# Patient Record
Sex: FEMALE | Race: WHITE | NOT HISPANIC OR LATINO | URBAN - METROPOLITAN AREA
[De-identification: names, ages, dates, MRNs, and addresses within clinical notes are randomized per-mention and may not be internally consistent; named-entity substitution may affect disease eponyms.]

---

## 2024-02-21 ENCOUNTER — INPATIENT (INPATIENT)
Facility: HOSPITAL | Age: 54
LOS: 0 days | Discharge: HOME | DRG: 930 | End: 2024-02-22
Attending: SURGERY | Admitting: SURGERY
Payer: MEDICAID

## 2024-02-21 VITALS
DIASTOLIC BLOOD PRESSURE: 79 MMHG | RESPIRATION RATE: 20 BRPM | SYSTOLIC BLOOD PRESSURE: 162 MMHG | TEMPERATURE: 98 F | WEIGHT: 160.06 LBS | HEART RATE: 84 BPM | OXYGEN SATURATION: 99 %

## 2024-02-21 DIAGNOSIS — S06.5X0A TRAUMATIC SUBDURAL HEMORRHAGE WITHOUT LOSS OF CONSCIOUSNESS, INITIAL ENCOUNTER: ICD-10-CM

## 2024-02-21 LAB
ALBUMIN SERPL ELPH-MCNC: 4.3 G/DL — SIGNIFICANT CHANGE UP (ref 3.5–5.2)
ALBUMIN SERPL ELPH-MCNC: 4.3 G/DL — SIGNIFICANT CHANGE UP (ref 3.5–5.2)
ALP SERPL-CCNC: 105 U/L — SIGNIFICANT CHANGE UP (ref 30–115)
ALP SERPL-CCNC: 108 U/L — SIGNIFICANT CHANGE UP (ref 30–115)
ALT FLD-CCNC: 21 U/L — SIGNIFICANT CHANGE UP (ref 0–41)
ALT FLD-CCNC: 21 U/L — SIGNIFICANT CHANGE UP (ref 0–41)
ANION GAP SERPL CALC-SCNC: 12 MMOL/L — SIGNIFICANT CHANGE UP (ref 7–14)
ANION GAP SERPL CALC-SCNC: 13 MMOL/L — SIGNIFICANT CHANGE UP (ref 7–14)
ANION GAP SERPL CALC-SCNC: 13 MMOL/L — SIGNIFICANT CHANGE UP (ref 7–14)
APPEARANCE UR: ABNORMAL
APTT BLD: 31.5 SEC — SIGNIFICANT CHANGE UP (ref 27–39.2)
AST SERPL-CCNC: 22 U/L — SIGNIFICANT CHANGE UP (ref 0–41)
AST SERPL-CCNC: 26 U/L — SIGNIFICANT CHANGE UP (ref 0–41)
BASOPHILS # BLD AUTO: 0.04 K/UL — SIGNIFICANT CHANGE UP (ref 0–0.2)
BASOPHILS # BLD AUTO: 0.05 K/UL — SIGNIFICANT CHANGE UP (ref 0–0.2)
BASOPHILS NFR BLD AUTO: 0.3 % — SIGNIFICANT CHANGE UP (ref 0–1)
BASOPHILS NFR BLD AUTO: 0.6 % — SIGNIFICANT CHANGE UP (ref 0–1)
BILIRUB SERPL-MCNC: 0.2 MG/DL — SIGNIFICANT CHANGE UP (ref 0.2–1.2)
BILIRUB SERPL-MCNC: 0.2 MG/DL — SIGNIFICANT CHANGE UP (ref 0.2–1.2)
BILIRUB UR-MCNC: NEGATIVE — SIGNIFICANT CHANGE UP
BUN SERPL-MCNC: 15 MG/DL — SIGNIFICANT CHANGE UP (ref 10–20)
BUN SERPL-MCNC: 22 MG/DL — HIGH (ref 10–20)
BUN SERPL-MCNC: 24 MG/DL — HIGH (ref 10–20)
CALCIUM SERPL-MCNC: 8.8 MG/DL — SIGNIFICANT CHANGE UP (ref 8.4–10.4)
CALCIUM SERPL-MCNC: 9.4 MG/DL — SIGNIFICANT CHANGE UP (ref 8.4–10.5)
CALCIUM SERPL-MCNC: 9.7 MG/DL — SIGNIFICANT CHANGE UP (ref 8.4–10.5)
CHLORIDE SERPL-SCNC: 105 MMOL/L — SIGNIFICANT CHANGE UP (ref 98–110)
CO2 SERPL-SCNC: 21 MMOL/L — SIGNIFICANT CHANGE UP (ref 17–32)
CO2 SERPL-SCNC: 21 MMOL/L — SIGNIFICANT CHANGE UP (ref 17–32)
CO2 SERPL-SCNC: 22 MMOL/L — SIGNIFICANT CHANGE UP (ref 17–32)
COLOR SPEC: ABNORMAL
CREAT SERPL-MCNC: 0.7 MG/DL — SIGNIFICANT CHANGE UP (ref 0.7–1.5)
CREAT SERPL-MCNC: 0.7 MG/DL — SIGNIFICANT CHANGE UP (ref 0.7–1.5)
CREAT SERPL-MCNC: 0.9 MG/DL — SIGNIFICANT CHANGE UP (ref 0.7–1.5)
DIFF PNL FLD: ABNORMAL
EGFR: 105 ML/MIN/1.73M2 — SIGNIFICANT CHANGE UP
EGFR: 105 ML/MIN/1.73M2 — SIGNIFICANT CHANGE UP
EGFR: 77 ML/MIN/1.73M2 — SIGNIFICANT CHANGE UP
EOSINOPHIL # BLD AUTO: 0.01 K/UL — SIGNIFICANT CHANGE UP (ref 0–0.7)
EOSINOPHIL # BLD AUTO: 0.05 K/UL — SIGNIFICANT CHANGE UP (ref 0–0.7)
EOSINOPHIL NFR BLD AUTO: 0.1 % — SIGNIFICANT CHANGE UP (ref 0–8)
EOSINOPHIL NFR BLD AUTO: 0.6 % — SIGNIFICANT CHANGE UP (ref 0–8)
ETHANOL SERPL-MCNC: <10 MG/DL — SIGNIFICANT CHANGE UP
GLUCOSE SERPL-MCNC: 108 MG/DL — HIGH (ref 70–99)
GLUCOSE SERPL-MCNC: 113 MG/DL — HIGH (ref 70–99)
GLUCOSE SERPL-MCNC: 88 MG/DL — SIGNIFICANT CHANGE UP (ref 70–99)
GLUCOSE UR QL: NEGATIVE MG/DL — SIGNIFICANT CHANGE UP
HCG SERPL QL: NEGATIVE — SIGNIFICANT CHANGE UP
HCT VFR BLD CALC: 32 % — LOW (ref 37–47)
HCT VFR BLD CALC: 34.9 % — LOW (ref 37–47)
HGB BLD-MCNC: 10.3 G/DL — LOW (ref 12–16)
HGB BLD-MCNC: 11.2 G/DL — LOW (ref 12–16)
IMM GRANULOCYTES NFR BLD AUTO: 0.4 % — HIGH (ref 0.1–0.3)
IMM GRANULOCYTES NFR BLD AUTO: 0.5 % — HIGH (ref 0.1–0.3)
INR BLD: 0.97 RATIO — SIGNIFICANT CHANGE UP (ref 0.65–1.3)
KETONES UR-MCNC: NEGATIVE MG/DL — SIGNIFICANT CHANGE UP
LACTATE SERPL-SCNC: 0.9 MMOL/L — SIGNIFICANT CHANGE UP (ref 0.7–2)
LEUKOCYTE ESTERASE UR-ACNC: ABNORMAL
LIDOCAIN IGE QN: 27 U/L — SIGNIFICANT CHANGE UP (ref 7–60)
LIDOCAIN IGE QN: 30 U/L — SIGNIFICANT CHANGE UP (ref 7–60)
LYMPHOCYTES # BLD AUTO: 1.07 K/UL — LOW (ref 1.2–3.4)
LYMPHOCYTES # BLD AUTO: 1.56 K/UL — SIGNIFICANT CHANGE UP (ref 1.2–3.4)
LYMPHOCYTES # BLD AUTO: 20.1 % — LOW (ref 20.5–51.1)
LYMPHOCYTES # BLD AUTO: 8.3 % — LOW (ref 20.5–51.1)
MAGNESIUM SERPL-MCNC: 1.8 MG/DL — SIGNIFICANT CHANGE UP (ref 1.8–2.4)
MCHC RBC-ENTMCNC: 25.3 PG — LOW (ref 27–31)
MCHC RBC-ENTMCNC: 25.5 PG — LOW (ref 27–31)
MCHC RBC-ENTMCNC: 32.1 G/DL — SIGNIFICANT CHANGE UP (ref 32–37)
MCHC RBC-ENTMCNC: 32.2 G/DL — SIGNIFICANT CHANGE UP (ref 32–37)
MCV RBC AUTO: 78.6 FL — LOW (ref 81–99)
MCV RBC AUTO: 79.3 FL — LOW (ref 81–99)
MONOCYTES # BLD AUTO: 0.47 K/UL — SIGNIFICANT CHANGE UP (ref 0.1–0.6)
MONOCYTES # BLD AUTO: 0.52 K/UL — SIGNIFICANT CHANGE UP (ref 0.1–0.6)
MONOCYTES NFR BLD AUTO: 4.1 % — SIGNIFICANT CHANGE UP (ref 1.7–9.3)
MONOCYTES NFR BLD AUTO: 6.1 % — SIGNIFICANT CHANGE UP (ref 1.7–9.3)
NEUTROPHILS # BLD AUTO: 11.13 K/UL — HIGH (ref 1.4–6.5)
NEUTROPHILS # BLD AUTO: 5.6 K/UL — SIGNIFICANT CHANGE UP (ref 1.4–6.5)
NEUTROPHILS NFR BLD AUTO: 72.2 % — SIGNIFICANT CHANGE UP (ref 42.2–75.2)
NEUTROPHILS NFR BLD AUTO: 86.7 % — HIGH (ref 42.2–75.2)
NITRITE UR-MCNC: NEGATIVE — SIGNIFICANT CHANGE UP
NRBC # BLD: 0 /100 WBCS — SIGNIFICANT CHANGE UP (ref 0–0)
NRBC # BLD: 0 /100 WBCS — SIGNIFICANT CHANGE UP (ref 0–0)
PH UR: 6 — SIGNIFICANT CHANGE UP (ref 5–8)
PHOSPHATE SERPL-MCNC: 3 MG/DL — SIGNIFICANT CHANGE UP (ref 2.1–4.9)
PLATELET # BLD AUTO: 254 K/UL — SIGNIFICANT CHANGE UP (ref 130–400)
PLATELET # BLD AUTO: 281 K/UL — SIGNIFICANT CHANGE UP (ref 130–400)
PMV BLD: 10.1 FL — SIGNIFICANT CHANGE UP (ref 7.4–10.4)
PMV BLD: 10.8 FL — HIGH (ref 7.4–10.4)
POTASSIUM SERPL-MCNC: 3.7 MMOL/L — SIGNIFICANT CHANGE UP (ref 3.5–5)
POTASSIUM SERPL-MCNC: 4.1 MMOL/L — SIGNIFICANT CHANGE UP (ref 3.5–5)
POTASSIUM SERPL-MCNC: 4.8 MMOL/L — SIGNIFICANT CHANGE UP (ref 3.5–5)
POTASSIUM SERPL-SCNC: 3.7 MMOL/L — SIGNIFICANT CHANGE UP (ref 3.5–5)
POTASSIUM SERPL-SCNC: 4.1 MMOL/L — SIGNIFICANT CHANGE UP (ref 3.5–5)
POTASSIUM SERPL-SCNC: 4.8 MMOL/L — SIGNIFICANT CHANGE UP (ref 3.5–5)
PROT SERPL-MCNC: 7.2 G/DL — SIGNIFICANT CHANGE UP (ref 6–8)
PROT SERPL-MCNC: 7.5 G/DL — SIGNIFICANT CHANGE UP (ref 6–8)
PROT UR-MCNC: 100 MG/DL
PROTHROM AB SERPL-ACNC: 11.1 SEC — SIGNIFICANT CHANGE UP (ref 9.95–12.87)
RBC # BLD: 4.07 M/UL — LOW (ref 4.2–5.4)
RBC # BLD: 4.4 M/UL — SIGNIFICANT CHANGE UP (ref 4.2–5.4)
RBC # FLD: 18 % — HIGH (ref 11.5–14.5)
RBC # FLD: 18.2 % — HIGH (ref 11.5–14.5)
SODIUM SERPL-SCNC: 139 MMOL/L — SIGNIFICANT CHANGE UP (ref 135–146)
SP GR SPEC: >1.03 — HIGH (ref 1–1.03)
UROBILINOGEN FLD QL: 1 MG/DL — SIGNIFICANT CHANGE UP (ref 0.2–1)
WBC # BLD: 12.83 K/UL — HIGH (ref 4.8–10.8)
WBC # BLD: 7.76 K/UL — SIGNIFICANT CHANGE UP (ref 4.8–10.8)
WBC # FLD AUTO: 12.83 K/UL — HIGH (ref 4.8–10.8)
WBC # FLD AUTO: 7.76 K/UL — SIGNIFICANT CHANGE UP (ref 4.8–10.8)

## 2024-02-21 PROCEDURE — 80048 BASIC METABOLIC PNL TOTAL CA: CPT

## 2024-02-21 PROCEDURE — 84100 ASSAY OF PHOSPHORUS: CPT

## 2024-02-21 PROCEDURE — G0378: CPT

## 2024-02-21 PROCEDURE — 70450 CT HEAD/BRAIN W/O DYE: CPT | Mod: 26,MA

## 2024-02-21 PROCEDURE — 83735 ASSAY OF MAGNESIUM: CPT

## 2024-02-21 PROCEDURE — 99291 CRITICAL CARE FIRST HOUR: CPT | Mod: 25

## 2024-02-21 PROCEDURE — 70450 CT HEAD/BRAIN W/O DYE: CPT | Mod: 26,MA,77

## 2024-02-21 PROCEDURE — 81001 URINALYSIS AUTO W/SCOPE: CPT

## 2024-02-21 PROCEDURE — 12002 RPR S/N/AX/GEN/TRNK2.6-7.5CM: CPT

## 2024-02-21 PROCEDURE — 85025 COMPLETE CBC W/AUTO DIFF WBC: CPT

## 2024-02-21 PROCEDURE — 71260 CT THORAX DX C+: CPT | Mod: 26,MA

## 2024-02-21 PROCEDURE — 99223 1ST HOSP IP/OBS HIGH 75: CPT

## 2024-02-21 PROCEDURE — 73110 X-RAY EXAM OF WRIST: CPT | Mod: 26,LT

## 2024-02-21 PROCEDURE — 36415 COLL VENOUS BLD VENIPUNCTURE: CPT

## 2024-02-21 PROCEDURE — 72125 CT NECK SPINE W/O DYE: CPT | Mod: 26,MA

## 2024-02-21 PROCEDURE — 71045 X-RAY EXAM CHEST 1 VIEW: CPT | Mod: 26

## 2024-02-21 PROCEDURE — 74177 CT ABD & PELVIS W/CONTRAST: CPT | Mod: 26,MA

## 2024-02-21 RX ORDER — ACETAMINOPHEN 500 MG
650 TABLET ORAL EVERY 6 HOURS
Refills: 0 | Status: DISCONTINUED | OUTPATIENT
Start: 2024-02-21 | End: 2024-02-22

## 2024-02-21 RX ORDER — LEVETIRACETAM 250 MG/1
500 TABLET, FILM COATED ORAL
Refills: 0 | Status: DISCONTINUED | OUTPATIENT
Start: 2024-02-21 | End: 2024-02-22

## 2024-02-21 RX ORDER — LEVETIRACETAM 250 MG/1
1000 TABLET, FILM COATED ORAL ONCE
Refills: 0 | Status: COMPLETED | OUTPATIENT
Start: 2024-02-21 | End: 2024-02-21

## 2024-02-21 RX ORDER — ACETAMINOPHEN 500 MG
975 TABLET ORAL ONCE
Refills: 0 | Status: COMPLETED | OUTPATIENT
Start: 2024-02-21 | End: 2024-02-21

## 2024-02-21 RX ORDER — TETANUS TOXOID, REDUCED DIPHTHERIA TOXOID AND ACELLULAR PERTUSSIS VACCINE, ADSORBED 5; 2.5; 8; 8; 2.5 [IU]/.5ML; [IU]/.5ML; UG/.5ML; UG/.5ML; UG/.5ML
0.5 SUSPENSION INTRAMUSCULAR ONCE
Refills: 0 | Status: COMPLETED | OUTPATIENT
Start: 2024-02-21 | End: 2024-02-21

## 2024-02-21 RX ADMIN — Medication 650 MILLIGRAM(S): at 18:16

## 2024-02-21 RX ADMIN — LEVETIRACETAM 500 MILLIGRAM(S): 250 TABLET, FILM COATED ORAL at 18:17

## 2024-02-21 RX ADMIN — Medication 975 MILLIGRAM(S): at 06:03

## 2024-02-21 RX ADMIN — LEVETIRACETAM 400 MILLIGRAM(S): 250 TABLET, FILM COATED ORAL at 12:11

## 2024-02-21 RX ADMIN — TETANUS TOXOID, REDUCED DIPHTHERIA TOXOID AND ACELLULAR PERTUSSIS VACCINE, ADSORBED 0.5 MILLILITER(S): 5; 2.5; 8; 8; 2.5 SUSPENSION INTRAMUSCULAR at 06:03

## 2024-02-21 NOTE — ED PROVIDER NOTE - ATTENDING APP SHARED VISIT CONTRIBUTION OF CARE
I reviewed and verified the documentation and  and independently performed the documented    53-year-old female no past medical history no antiplatelet anticoagulation presents status post fall patient says she slipped down about 10 steps inside her home on her back landed supine at the bottom of the steps no LOC called as a family immediately acting herself assisted up sustained laceration to back of her head.  Patient denies headache neck pain nausea vomiting paresthesias numbness or extremities patient only complains of left wrist pain.  Alert and oriented, GCS 15.  PERRL, EOMI,  No raccoon or santamaria sign.  No hemotympanum.  NCAT.  Neck is supple, no midline C-Spine tenderness.  CN 2-12 intact.  Motor strength and sensory response is symmetric.  CVS RRR.  Resp CTA b/l.  No distress, speaking clearly.  abd soft NT/ND.  No shoulder, elbow or hand tenderness. lef wrist  mild  swelling ,  superficial abrasion  2+ Radial pulse b/l and equal. Full ROM at all joints of b/l UE. No midline vertebral tenderness.  No rib tenderness, no crepitus. No ecchymosis to abd wall, back wall, or chest wall. Pelvis is stable. Hips non tender.  Full ROM at hips, knees and ankles.  nexus criteria met -  C collar removed that was plced by ems.  CT head c spine cxr  wrist exray

## 2024-02-21 NOTE — H&P ADULT - HISTORY OF PRESENT ILLNESS
TRAUMA ACTIVATION LEVEL:  ALERT    ACTIVATED BY: ED**  INTUBATED: NO**      MECHANISM OF INJURY:   [] Blunt     [] MVC	  [xx] Fall	  [] Pedestrian Struck	  [] Motorcycle     [] Assault     [] Bicycle collision    [] Sports injury    [] Penetrating    [] Gun Shot Wound      [] Stab Wound    GCS: 15 	E: 4	V: 5	M: 6    HPI:    53y Female w/o any significant PMHx seen as Trauma Alert s/p mechanical fall +HT, -LOC, -AC with complaint of Left arm pain, external signs of trauma include scalp laceration w staples, L forearm abrasion and R flank and chest abrasion. Pt reports she was ambulating around her house w the light off when she tripped and fell down 5steps. Pt reports she was able to ambulate afterwards without any problem, denies any dizziness or headache. Patient was initially seen at [facility], CT head and Cspine was performed w significant finding of SAH. Trauma assessment in ED: ABCs intact , GCS 15 , AAOx3,  CHING.

## 2024-02-21 NOTE — ED PROVIDER NOTE - OBJECTIVE STATEMENT
53 year old female no sig past medical history comes to emergency room status post fall. patient states slipped and fell down 10 steps. hit back of head, no loss of consciousness. patient was able to stand with assistance. patient states she is having pain in the back of head and left wrist. no nausea and vomiting. no dizziness. no weakness

## 2024-02-21 NOTE — ED PROVIDER NOTE - PHYSICAL EXAMINATION
Physical Exam    Vital Signs: I have reviewed the initial vital signs.  Constitutional: well-nourished, appears stated age, no acute distress  Eyes: Conjunctiva pink, Sclera clear, PERRLA, EOMI.  Cardiovascular: S1 and S2, regular rate, regular rhythm, well-perfused extremities, radial pulses equal and 2+  Respiratory: unlabored respiratory effort, clear to auscultation bilaterally no wheezing, rales and rhonchi  Gastrointestinal: soft, non-tender abdomen, no pulsatile mass, normal bowl sounds  Musculoskeletal: supple neck, no lower extremity edema, no midline tenderness  Integumentary: warm, dry, 3 lacs to posterior scalp totaling 5cm   Neurologic: awake, alert, cranial nerves II-XII grossly intact, extremities’ motor and sensory functions grossly intact  Psychiatric: appropriate mood, appropriate affect

## 2024-02-21 NOTE — CONSULT NOTE ADULT - SUBJECTIVE AND OBJECTIVE BOX
A/P  1.) Hyperopia/Presbyopia each eye, Astigmatism left eye  -Refractive amblyopia left eye, longstanding since childhood  -Updated spec Rx, excellent vision in right eye and corrects to 20/40- in left eye  -Mild hyperopic shift compared to 2013 numbers  -Doing well in MF soft CL's, can increase reading add    2.) Chorioretinal scar left eye  -Quiet, likely longstanding but first time noted today    Monitor 1 year routine, sooner prn     NEUROSURGERY CONSULT  MADY TORO   02-21-24 @ 10:44    HPI: 53 year old female no sig past medical history comes to emergency room status post fall. patient states slipped and fell down 10 steps. hit back of head, no loss of consciousness. patient was able to stand with assistance. patient states she is having pain in the back of head and left wrist. no nausea and vomiting. no dizziness. no weakness    Neurosurgery was consulted for patient s/p fall down stairs no AC/AP. CTH with trace interhemispheric tSAH. Patient denies any complaints, N/V, blurry vision, changes in vision. Denies LOC after fall and was ambulatory after fall. States that only pain is to her left wrist.     RADIOLOGY:   < from: CT Head No Cont (02.21.24 @ 08:35) >  IMPRESSION:  CT HEAD:  Trace hyperdensity in the interhemispheric fissure likely representing   subarachnoid hemorrhage in setting of trauma.  CT CERVICAL SPINE:  No acute cervical fracture or facet subluxation.  Incidental nonspecific subcentimeter lucency in the C5 vertebral body.   Further characterization with contrast-enhanced cervical MRI can be   considered as an outpatient.    PAST MEDICAL & SURGICAL HISTORY:  No pertinent past medical history  No significant past surgical history    FAMILY HISTORY:    SOCIAL HISTORY:  Tobacco Use:  EtOH use:   Substance:    Allergies  No Known Allergies  Intolerances    [X] A ten-point review of systems is negative except as noted   [  ] Due to altered mental status/intubation, subjective information were not able to be obtained from the patient. History was obtained, to the extent possible, from review of the chart and collateral sources of information    MEDS:    PHYSICAL EXAM:  Awake, alert, following commands, Ox3   PERRL, EOMI   Face symmetrical   Facial sensation intact  MAEx4 with good strength   No drift   SILT     Vital Signs Last 24 Hrs  T(C): 36.5 (21 Feb 2024 10:05), Max: 36.5 (21 Feb 2024 10:05)  T(F): 97.7 (21 Feb 2024 10:05), Max: 97.7 (21 Feb 2024 10:05)  HR: 78 (21 Feb 2024 10:05) (78 - 84)  BP: 153/87 (21 Feb 2024 10:05) (153/87 - 162/79)  BP(mean): --  RR: 18 (21 Feb 2024 10:05) (18 - 20)  SpO2: 99% (21 Feb 2024 10:05) (99% - 99%)    Parameters below as of 21 Feb 2024 10:05  Patient On (Oxygen Delivery Method): room air          LABS:                        11.2   12.83 )-----------( 281      ( 21 Feb 2024 09:15 )             34.9           PT/INR - ( 21 Feb 2024 09:15 )   PT: 11.10 sec;   INR: 0.97 ratio         PTT - ( 21 Feb 2024 09:15 )  PTT:31.5 sec

## 2024-02-21 NOTE — ED PROVIDER NOTE - ATTENDING CONTRIBUTION TO CARE
I have personally performed a history and physical exam on this patient and personally directed the management of the patient. I reviewed and verified the documentation and  and independently performed the documented    53-year-old female no past medical history no antiplatelet anticoagulation presents status post fall patient says she slipped down about 10 steps inside her home on her back landed supine at the bottom of the steps no LOC called as a family immediately acting herself assisted up sustained laceration to back of her head.  Patient denies headache neck pain nausea vomiting paresthesias numbness or extremities patient only complains of left wrist pain.  Alert and oriented, GCS 15.  PERRL, EOMI,  No raccoon or santamaria sign.  No hemotympanum.  NCAT.  Neck is supple, no midline C-Spine tenderness.  CN 2-12 intact.  Motor strength and sensory response is symmetric.  CVS RRR.  Resp CTA b/l.  No distress, speaking clearly.  abd soft NT/ND.  No shoulder, elbow or hand tenderness. lef wrist  mild  swelling ,  superficial abrasion  2+ Radial pulse b/l and equal. Full ROM at all joints of b/l UE. No midline vertebral tenderness.  No rib tenderness, no crepitus. No ecchymosis to abd wall, back wall, or chest wall. Pelvis is stable. Hips non tender.  Full ROM at hips, knees and ankles.  nexus criteria met -  C collar removed that was plced by ems.  CT head c spine cxr  wrist exray

## 2024-02-21 NOTE — H&P ADULT - ATTENDING COMMENTS
I evaluated this patient at around 10:15 am on 2/21/2024    This is 53y Female w/o any significant PMHx seen as Trauma Alert s/p mechanical fall +HT, -LOC, -AC with complaint of Left arm pain, external signs of trauma include scalp laceration w staples, L forearm abrasion and R flank and chest abrasion. Pt reports she was ambulating around her house w the light off when she tripped and fell down 5steps. Pt reports she was able to ambulate afterwards without any problem, denies any dizziness or headache. Patient was initially seen at [facility], CT head and C-spine was performed w significant finding of SAH.    Primary and secondary surveys were performed.    AAO x3  GCS 15.  Neuro intact.  Scalp laceration in posterior parietal area, irregular shape, about 4 cm long, stapled.  Neck: no step-offs  Chest:: has bilateral breath sounds present  CV : rrr  Abdomen: soft  Extr: left forearm hematoma and abrasion.    All images and labs were reviewed.    CT Head - interhemispheric SAH, left SDH  CT C-spine - no fractures  CT Chest/Abd/Pelvis - possible anterior mediastinum hematoma.    ASSESSMENT:  54 y/o female, S/P Fall down stairs  Traumatic SAH.  Traumatic Left SDH.  Scalp laceration.  Anterior Mediastinal Hematoma.  Left forearm contusion.    PLAN:  - intermittent neuro checks  - Neurosurgery eval  - keep normotensive  - liquid diet  - follow serum electrolytes and UOP  - DVT prophylaxis  Admit to SDU

## 2024-02-21 NOTE — PATIENT PROFILE ADULT - FALL HARM RISK - HARM RISK INTERVENTIONS

## 2024-02-21 NOTE — H&P ADULT - ASSESSMENT
53y Female w/o any significant PMHx seen as Trauma Alert s/p mechanical fall +HT, -LOC, -AC with complaint of Left arm pain, external signs of trauma include scalp laceration w staples, L forearm abrasion and R flank and chest abrasion.      Injuries identified:   - scalp laceration w staples   - L forearm abrasion  - R flank and chest abrasion  - SAH and SDH      PLAN:   - admit to SDU, under the trauma service  - q4hrs neurochecks  - repeat head CT evaluated  - keppra BID  -pain control  -regular diet  -PT/Rehab    spectra [phone]

## 2024-02-21 NOTE — ED PROVIDER NOTE - CARE PLAN
1 Principal Discharge DX:	Subarachnoid hemorrhage   Principal Discharge DX:	Subarachnoid hemorrhage  Secondary Diagnosis:	Subdural hematoma  Secondary Diagnosis:	Mediastinal hematoma, initial encounter

## 2024-02-21 NOTE — H&P ADULT - NSHPLABSRESULTS_GEN_ALL_CORE
LABS  CBC (02-21 @ 09:15)                              11.2<L>                         12.83<H>  )----------------(  281        86.7<H>% Neutrophils, 8.3<L>% Lymphocytes, ANC: 11.13<H>                              34.9<L>    BMP (02-21 @ 10:20)             139     |  105     |  22<H> 		Ca++ --      Ca 9.4                ---------------------------------( 108<H>		Mg --                 4.8     |  21      |  0.7   			Ph --      BMP (02-21 @ 09:15)             139     |  105     |  24<H> 		Ca++ --      Ca 9.7                ---------------------------------( 113<H>		Mg --                 4.1     |  21      |  0.7   			Ph --        LFTs (02-21 @ 10:20)      TPro 7.5 / Alb 4.3 / TBili 0.2 / DBili -- / AST 26 / ALT 21 / AlkPhos 105  LFTs (02-21 @ 09:15)      TPro 7.2 / Alb 4.3 / TBili 0.2 / DBili -- / AST 22 / ALT 21 / AlkPhos 108    Coags (02-21 @ 09:15)  aPTT 31.5 / INR 0.97 / PT 11.10      ABG (02-21 @ 09:15)      /  /  /  /  / %     Lactate:  0.9      --------------------------------------------------------------------------------------------    MICROBIOLOGY  Urinalysis (02-21 @ 10:20):     Color:  / Appearance:  / SG:  / pH:  / Gluc: 108<H> / Ketones:  / Bili:  / Urobili:  / Protein : / Nitrites:  / Leuk.Est:  / RBC:  / WBC:  / Sq Epi:  / Non Sq Epi:  / Bacteria          --------------------------------------------------------------------------------------------    I&O's Summary      < from: CT Cervical Spine No Cont (02.21.24 @ 08:35) >    IMPRESSION:    CT HEAD:  Trace hyperdensity in the interhemispheric fissure likely representing   subarachnoid hemorrhage in setting of trauma.    CT CERVICAL SPINE:  No acute cervical fracture or facet subluxation.    Incidental nonspecific subcentimeter lucency in the C5 vertebral body.   Further characterization with contrast-enhanced cervical MRI can be   considered as an outpatient.    Communication: The summary of above findings were discussed with readback   confirmation with Dr. Sr byradiologist Dr. Marte on 2/21/2024 at   9:03 AM.      < end of copied text >    < from: CT Head No Cont (02.21.24 @ 13:07) >    IMPRESSION:    1.  Trace subarachnoid hemorrhage in the anterior interhemispheric   fissure is resolving    2.  There is probably a small amount of stable subdural hemorrhage along   the left tentorial margin.    3.  No mass effect, no new hemorrhage.    --- End of Report ---      < end of copied text >    < from: CT Chest w/ IV Cont (02.21.24 @ 13:17) >    IMPRESSION:    1. Mild soft tissue stranding along the anterior mediastinum, possibly   reflecting small hemorrhage, however other etiologies are possible,   including residual thymic tissue. Correlation with prior imaging is   suggested if available; if prior imaging is not available, clinical   correlation and follow-up after an appropriate clinical interval may be   of use.  2. Otherwise, no definite evidence of acute traumatic injury within the   chest, abdomen or pelvis.  3. Lobulated fibroid uterus.    --- End of Report ---      < end of copied text >    < from: CT Abdomen and Pelvis w/ IV Cont (02.21.24 @ 13:18) >        < end of copied text >

## 2024-02-21 NOTE — CONSULT NOTE ADULT - SUBJECTIVE AND OBJECTIVE BOX
TRAUMA ACTIVATION LEVEL:  ALERT    ACTIVATED BY: ED**  INTUBATED: NO**      MECHANISM OF INJURY:   [] Blunt     [] MVC	  [xx] Fall	  [] Pedestrian Struck	  [] Motorcycle     [] Assault     [] Bicycle collision    [] Sports injury    [] Penetrating    [] Gun Shot Wound      [] Stab Wound    GCS: 15 	E: 4	V: 5	M: 6    HPI:    53y Female w/o any significant PMHx seen as Trauma Alert s/p mechanical fall +HT, -LOC, -AC with complaint of Left arm pain, external signs of trauma include scalp laceration w staples, L forearm abrasion and R flank and chest abrasion. Pt reports she was ambulating around her house w the light off when she tripped and fell down 5steps. Pt reports she was able to ambulate afterwards without any problem, denies any dizziness or headache. Patient was initially seen at [facility], CT head and Cspine was performed w significant finding of SAH. Trauma assessment in ED: ABCs intact , GCS 15 , AAOx3,  CHING.     PAST MEDICAL & SURGICAL HISTORY:  No pertinent past medical history      No significant past surgical history          Allergies    No Known Allergies    Intolerances        Home Medications:      ROS: 10-system review is otherwise negative except HPI above.      Primary Survey:    A - airway intact  B - bilateral breath sounds and good chest rise  C - palpable pulses in all extremities  D - GCS 15 on arrival, CHING  Exposure obtained    Vital Signs Last 24 Hrs  T(C): 36.5 (21 Feb 2024 10:05), Max: 36.5 (21 Feb 2024 10:05)  T(F): 97.7 (21 Feb 2024 10:05), Max: 97.7 (21 Feb 2024 10:05)  HR: 78 (21 Feb 2024 10:05) (78 - 84)  BP: 153/87 (21 Feb 2024 10:05) (153/87 - 162/79)  BP(mean): --  RR: 18 (21 Feb 2024 10:05) (18 - 20)  SpO2: 99% (21 Feb 2024 10:05) (99% - 99%)    Parameters below as of 21 Feb 2024 10:05  Patient On (Oxygen Delivery Method): room air        Secondary Survey:   General: NAD  Chest: No chest wall tenderness, no subcutaneous emphysema. R chest/flank abrasion  Cardiac: S1, S2, RRR  Respiratory: Bilateral breath sounds, clear and equal bilaterally  Abdomen: Soft, non-distended, non-tender, no rebound, no guarding.  Groin: Normal appearing, pelvis stable   Ext:  Moving b/l upper and lower extremities. Palpable Radial b/l UE, b/l DP palpable in LE. L forearm abrasion  Back: No T/L/S spine tenderness, No palpable runoff/stepoff/deformity      ACCESS / DEVICES:  [ xx] Peripheral IV  [ ] Central Venous Line	[ ] R	[ ] L	[ ] IJ	[ ] Fem	[ ] SC	Placed:   [ ] Arterial Line		[ ] R	[ ] L	[ ] Fem	[ ] Rad	[ ] Ax	Placed:   [ ] PICC:					[ ] Mediport  [ ] Urinary Catheter,  Date Placed:   [ ] Chest tube: [ ] Right, [ ] Left  [ ] FIGUEROA/Royce Drains    Labs:  CAPILLARY BLOOD GLUCOSE                              11.2   12.83 )-----------( 281      ( 21 Feb 2024 09:15 )             34.9       Auto Neutrophil %: 86.7 % (02-21-24 @ 09:15)  Auto Immature Granulocyte %: 0.5 % (02-21-24 @ 09:15)            LFTs:     Lactate, Blood: 0.9 mmol/L (02-21-24 @ 09:15)      Coags:     11.10  ----< 0.97    ( 21 Feb 2024 09:15 )     31.5              Alcohol, Blood: <10 mg/dL (02-21-24 @ 09:15)            Alcohol, Blood: <10 mg/dL (02-21-24 @ 09:15)      RADIOLOGY & ADDITIONAL STUDIES:  ---------------------------------------------------------------------------------------    < from: CT Cervical Spine No Cont (02.21.24 @ 08:35) >      IMPRESSION:    CT HEAD:  Trace hyperdensity in the interhemispheric fissure likely representing   subarachnoid hemorrhage in setting of trauma.    CT CERVICAL SPINE:  No acute cervical fracture or facet subluxation.    Incidental nonspecific subcentimeter lucency in the C5 vertebral body.   Further characterization with contrast-enhanced cervical MRI can be   considered as an outpatient.    Communication: The summary of above findings were discussed with readback   confirmation with Dr. Sr byradiologist Dr. Marte on 2/21/2024 at   9:03 AM.    --- End of Report ---      < end of copied text >   TRAUMA ACTIVATION LEVEL:  ALERT    ACTIVATED BY: ED**  INTUBATED: NO**      MECHANISM OF INJURY:   [] Blunt     [] MVC	  [xx] Fall	  [] Pedestrian Struck	  [] Motorcycle     [] Assault     [] Bicycle collision    [] Sports injury    [] Penetrating    [] Gun Shot Wound      [] Stab Wound    GCS: 15 	E: 4	V: 5	M: 6    HPI:    53y Female w/o any significant PMHx seen as Trauma Alert s/p mechanical fall +HT, -LOC, -AC with complaint of Left arm pain, external signs of trauma include scalp laceration w staples, L forearm abrasion and R flank and chest abrasion. Pt reports she was ambulating around her house w the light off when she tripped and fell down 5steps. Pt reports she was able to ambulate afterwards without any problem, denies any dizziness or headache. Patient was initially seen at [facility], CT head and Cspine was performed w significant finding of SAH. Trauma assessment in ED: ABCs intact , GCS 15 , AAOx3,  CHING.     PAST MEDICAL & SURGICAL HISTORY:  No pertinent past medical history      No significant past surgical history          Allergies    No Known Allergies    Intolerances        Home Medications:      ROS: 10-system review is otherwise negative except HPI above.      Primary Survey:    A - airway intact  B - bilateral breath sounds and good chest rise  C - palpable pulses in all extremities  D - GCS 15 on arrival, CHING  Exposure obtained    Vital Signs Last 24 Hrs  T(C): 36.5 (21 Feb 2024 10:05), Max: 36.5 (21 Feb 2024 10:05)  T(F): 97.7 (21 Feb 2024 10:05), Max: 97.7 (21 Feb 2024 10:05)  HR: 78 (21 Feb 2024 10:05) (78 - 84)  BP: 153/87 (21 Feb 2024 10:05) (153/87 - 162/79)  BP(mean): --  RR: 18 (21 Feb 2024 10:05) (18 - 20)  SpO2: 99% (21 Feb 2024 10:05) (99% - 99%)    Parameters below as of 21 Feb 2024 10:05  Patient On (Oxygen Delivery Method): room air        Secondary Survey:   General: NAD  Head: normocephalic, parietal head lac with staples, no palpable hematoma  Chest: No chest wall tenderness, no subcutaneous emphysema. R chest/flank abrasion  Cardiac: S1, S2, RRR  Respiratory: Bilateral breath sounds, clear and equal bilaterally  Abdomen: Soft, non-distended, non-tender, no rebound, no guarding.  Groin: Normal appearing, pelvis stable   Ext:  Moving b/l upper and lower extremities. Palpable Radial b/l UE, b/l DP palpable in LE. L forearm abrasion  Back: No T/L/S spine tenderness, No palpable runoff/stepoff/deformity      ACCESS / DEVICES:  [ xx] Peripheral IV  [ ] Central Venous Line	[ ] R	[ ] L	[ ] IJ	[ ] Fem	[ ] SC	Placed:   [ ] Arterial Line		[ ] R	[ ] L	[ ] Fem	[ ] Rad	[ ] Ax	Placed:   [ ] PICC:					[ ] Mediport  [ ] Urinary Catheter,  Date Placed:   [ ] Chest tube: [ ] Right, [ ] Left  [ ] FIGUEROA/Royce Drains    Labs:  CAPILLARY BLOOD GLUCOSE                              11.2   12.83 )-----------( 281      ( 21 Feb 2024 09:15 )             34.9       Auto Neutrophil %: 86.7 % (02-21-24 @ 09:15)  Auto Immature Granulocyte %: 0.5 % (02-21-24 @ 09:15)            LFTs:     Lactate, Blood: 0.9 mmol/L (02-21-24 @ 09:15)      Coags:     11.10  ----< 0.97    ( 21 Feb 2024 09:15 )     31.5              Alcohol, Blood: <10 mg/dL (02-21-24 @ 09:15)            Alcohol, Blood: <10 mg/dL (02-21-24 @ 09:15)      RADIOLOGY & ADDITIONAL STUDIES:  ---------------------------------------------------------------------------------------    < from: CT Cervical Spine No Cont (02.21.24 @ 08:35) >      IMPRESSION:    CT HEAD:  Trace hyperdensity in the interhemispheric fissure likely representing   subarachnoid hemorrhage in setting of trauma.    CT CERVICAL SPINE:  No acute cervical fracture or facet subluxation.    Incidental nonspecific subcentimeter lucency in the C5 vertebral body.   Further characterization with contrast-enhanced cervical MRI can be   considered as an outpatient.    Communication: The summary of above findings were discussed with readback   confirmation with Dr. Sr byradiologist Dr. Marte on 2/21/2024 at   9:03 AM.    --- End of Report ---      < end of copied text >

## 2024-02-21 NOTE — CONSULT NOTE ADULT - SUBJECTIVE AND OBJECTIVE BOX
HPI:    53y Female w/o any significant PMHx seen as Trauma Alert s/p mechanical fall +HT, -LOC, -AC with complaint of Left arm pain, external signs of trauma include scalp laceration w staples, L forearm abrasion and R flank and chest abrasion. Pt reports she was ambulating around her house w the light off when she tripped and fell down 5steps. Pt reports she was able to ambulate afterwards without any problem, denies any dizziness or headache. Patient was initially seen at [facility], CT head and Cspine was performed w significant finding of SAH. Trauma assessment in ED: ABCs intact , GCS 15 , AAOx3,  CHING.      < from: CT Head No Cont (02.21.24 @ 13:07) >  IMPRESSION:    1.  Trace subarachnoid hemorrhage in the anterior interhemispheric   fissure is resolving    2.  There is probably a small amount of stable subdural hemorrhage along   the left tentorial margin.    3.  No mass effect, no new hemorrhage.    < end of copied text >        PAST MEDICAL & SURGICAL HISTORY:  No pertinent past medical history      No significant past surgical history          Hospital Course:    TODAY'S SUBJECTIVE & REVIEW OF SYMPTOMS:     Constitutional WNL   Cardio WNL   Resp WNL   GI WNL  Heme WNL  Endo WNL  Skin scalp laceration   MSK WNL  Neuro WNL  Cognitive WNL  Psych WNL      MEDICATIONS  (STANDING):  acetaminophen     Tablet .. 650 milliGRAM(s) Oral every 6 hours  levETIRAcetam 500 milliGRAM(s) Oral two times a day    MEDICATIONS  (PRN):      FAMILY HISTORY:      Allergies    No Known Allergies    Intolerances        SOCIAL HISTORY:    [  ] Etoh  [  ] Smoking  [  ] Substance abuse     Home Environment:  [   ] Home Alone  [ x  ] Lives with Family  [   ] Home Health Aid    Dwelling:  [   ] Apartment  [ x  ] Private House  [   ] Adult Home  [   ] Skilled Nursing Facility      [   ] Short Term  [   ] Long Term  [ x  ] Stairs       Elevator [   ]    FUNCTIONAL STATUS PTA: (Check all that apply)  Ambulation: [  x  ]Independent    [   ] Dependent     [   ] Non-Ambulatory  Assistive Device: [   ] SA Cane  [   ]  Q Cane  [   ] Walker  [   ]  Wheelchair  ADL : [ x ] Independent  [    ]  Dependent       Vital Signs Last 24 Hrs  T(C): 36.2 (21 Feb 2024 16:12), Max: 36.5 (21 Feb 2024 10:05)  T(F): 97.2 (21 Feb 2024 16:12), Max: 97.7 (21 Feb 2024 10:05)  HR: 74 (21 Feb 2024 16:12) (71 - 84)  BP: 151/76 (21 Feb 2024 16:12) (151/76 - 162/79)  BP(mean): --  RR: 18 (21 Feb 2024 16:12) (16 - 20)  SpO2: 100% (21 Feb 2024 16:12) (97% - 100%)    Parameters below as of 21 Feb 2024 16:12  Patient On (Oxygen Delivery Method): room air          PHYSICAL EXAM: Awake & Alert  GENERAL: NAD  HEAD:  Normocephalic  CHEST/LUNG: Clear   HEART: S1S2+  ABDOMEN: Soft, Nontender  EXTREMITIES:  no calf tenderness    NERVOUS SYSTEM:  Cranial Nerves 2-12 intact [   ] Abnormal  [   ]  ROM: WFL all extremities [  x ]  Abnormal [   ]  Motor Strength: WFL all extremities  [  x ]  Abnormal [   ]  Sensation: intact to light touch [x   ] Abnormal [   ]    FUNCTIONAL STATUS:  Bed Mobility: Independent [   ]  Supervision [  x ]  Needs Assistance [   ]  N/A [   ]  Transfers: Independent [   ]  Supervision [  x ]  Needs Assistance [   ]  N/A [   ]   Ambulation: Independent [   ]  Supervision [   ]  Needs Assistance [   ]  N/A [   ]  ADL: Independent [   ] Requires Assistance [   ] N/A [   ]      LABS:                        11.2   12.83 )-----------( 281      ( 21 Feb 2024 09:15 )             34.9     02-21    139  |  105  |  22<H>  ----------------------------<  108<H>  4.8   |  21  |  0.7    Ca    9.4      21 Feb 2024 10:20    TPro  7.5  /  Alb  4.3  /  TBili  0.2  /  DBili  x   /  AST  26  /  ALT  21  /  AlkPhos  105  02-21    PT/INR - ( 21 Feb 2024 09:15 )   PT: 11.10 sec;   INR: 0.97 ratio         PTT - ( 21 Feb 2024 09:15 )  PTT:31.5 sec  Urinalysis Basic - ( 21 Feb 2024 10:20 )    Color: x / Appearance: x / SG: x / pH: x  Gluc: 108 mg/dL / Ketone: x  / Bili: x / Urobili: x   Blood: x / Protein: x / Nitrite: x   Leuk Esterase: x / RBC: x / WBC x   Sq Epi: x / Non Sq Epi: x / Bacteria: x        RADIOLOGY & ADDITIONAL STUDIES:

## 2024-02-21 NOTE — ED PROVIDER NOTE - PROGRESS NOTE DETAILS
Authored by Dr. Maynor Spivey patient arrived to [facility] trauma alert activated. Dr. delacruz and trauma resident bedside. On exam found to have ecchymosis to right lateral chest wall with abrasion and ecchymosis to sacrum into right buttock will add ct chest abdomen/pelvis and consult neurosx. Authored by Dr. Maynor Spivey spoke with dr. nair who recommended patient be admitted to [facility]. Spoke with [facility] attending Dr. Velasco aware of admission

## 2024-02-21 NOTE — ED ADULT NURSE REASSESSMENT NOTE - NS ED NURSE REASSESS COMMENT FT1
Patient transfer from [facility] for neurosurgery/trauma eval. pepper Aox4- trauma alert called

## 2024-02-21 NOTE — ED PROVIDER NOTE - NS ED ATTENDING STATEMENT MOD
This was a shared visit with the CARLIN. I reviewed and verified the documentation. I have personally provided the amount of critical care time documented below concurrently with the resident/fellow.  This time excludes time spent on separate procedures and time spent teaching. I have reviewed the resident’s / fellow’s documentation and I agree with the history, exam, and assessment and plan of care.

## 2024-02-21 NOTE — ED PROVIDER NOTE - CLINICAL SUMMARY MEDICAL DECISION MAKING FREE TEXT BOX
Pt  present sp  fall sown stairs  + CHi no loc gcs 15, lac to scalp repaired staples, xray and ct scans for trauma Pt  present sp  fall sown stairs  + CHi no loc gcs 15, lac to scalp repaired staples, xray and ct scans for trauma  fs: patient Patient found to have subarachnoid hemorrhage on CT scan although finding concerning patient is clinically stable she is able to actually ambulate here in the emergency department no complaints GCS 15 protecting her airway as patient is on no oral anticoagulation given findings we initiated transfer discussed case with accepting physician per trauma protocols discussed case with neurosurgery advised transfer for further evaluation at this time patient updated and agrees with plan of care Pt  present sp  fall sown stairs  + CHi no loc gcs 15, lac to scalp repaired staples, xray and ct scans for trauma  fs: patient Patient found to have subarachnoid hemorrhage on CT scan although finding concerning patient is clinically stable she is able to actually ambulate here in the emergency department no complaints GCS 15 protecting her airway as patient is on no oral anticoagulation given findings we initiated transfer discussed case with accepting physician per trauma protocols discussed case with neurosurgery advised transfer for further evaluation at this time patient updated and agrees with plan of care  patient transferred north, completion scans performed ct chest demonstrated < from: CT Chest w/ IV Cont (02.21.24 @ 13:17) >Mild soft tissue stranding along the anterior mediastinum, possibly reflecting small hemorrhage, however other etiologies are possible, including residual thymic tissue ct head < from: CT Head No Cont (02.21.24 @ 13:07) >1.  Trace subarachnoid hemorrhage in the anterior interhemispheric   fissure is resolving2.  There is probably a small amount of stable subdural hemorrhage along the left tentorial margin. Cased discussed with trauma attending who recommended patient be admitted to [facility]. Discussed with [facility] attending Dr. corcoran

## 2024-02-21 NOTE — H&P ADULT - NSHPPHYSICALEXAM_GEN_ALL_CORE
General: NAD  Head:   Chest: No chest wall tenderness, no subcutaneous emphysema. R chest/flank abrasion  Cardiac: S1, S2, RRR  Respiratory: Bilateral breath sounds, clear and equal bilaterally  Abdomen: Soft, non-distended, non-tender, no rebound, no guarding.  Groin: Normal appearing, pelvis stable   Ext:  Moving b/l upper and lower extremities. Palpable Radial b/l UE, b/l DP palpable in LE. L forearm abrasion  Back: No T/L/S spine tenderness, No palpable runoff/stepoff/deformity General: NAD  Head: parietal head lac with staples, no palpable hematoma  Chest: No chest wall tenderness, no subcutaneous emphysema. R chest/flank abrasion  Cardiac: S1, S2, RRR  Respiratory: Bilateral breath sounds, clear and equal bilaterally  Abdomen: Soft, non-distended, non-tender, no rebound, no guarding.  Groin: Normal appearing, pelvis stable   Ext:  Moving b/l upper and lower extremities. Palpable Radial b/l UE, b/l DP palpable in LE. L forearm abrasion  Back: No T/L/S spine tenderness, No palpable runoff/stepoff/deformity

## 2024-02-21 NOTE — CONSULT NOTE ADULT - ASSESSMENT
ASSESSMENT:  53y Female w/o any significant PMHx seen as Trauma Alert s/p mechanical fall +HT, -LOC, -AC with complaint of Left arm pain, external signs of trauma include scalp laceration w staples, L forearm abrasion and R flank and chest abrasion. Pt reports she was ambulating around her house w the light off when she tripped and fell down 5steps. Pt reports she was able to ambulate afterwards without any problem, denies any dizziness or headache. Patient was initially seen at [facility], CT head and Cspine was performed w significant finding of SAH. Trauma assessment in ED: ABCs intact , GCS 15 , AAOx3,  CHING.     Injuries identified:   - scalp laceration w staples   - L forearm abrassion  - R flank and chest abrassion    PLAN:   - Trauma Labs: (CBC, BMP, Coags, T&S, UA, EtOH level)  Additional studies:  EKG  Utox    Trauma Imaging to include the following:  - CXR, Pelvic Xray  - CT Head: read as above  - CT C-spine: negative  - CT Chest, CT Abd/Pelvis pending  - Extremity films: None    Additional consultations:   - Neurosurgery  - PT/Rehab/SW  - Hospitalist/Medicine     Disposition pending results of above labs and imaging  Above plan discussed with Trauma attending, Dr. Luciano , patient, patient family, and ED team  --------------------------------------------------------------------------------------  02-21-24 @ 10:42    TRAUMA SENIOR SPECTRA: [id number]  TRAUMA TEAM SPECTRA: [id number]
53 F with no PMH s/p fall down stairs with CTH showing tace interhemispheric tSAH - AC/AP - LOC     PLAN   - No acute neurosurgical intervention   - Repeat CTH in 4-6 hours   - Keppra 1G then 500 BID x 7 days   - Ok for Q4 neuro checks   - If repeat CTH stable then ok to resume DVT chemoppx & follow up outpatient with neurosurgery team   - Discussed case with attending 
IMPRESSION: Rehab of traumatic SAH / s/p fall with head trauma     PRECAUTIONS: [   ] Cardiac  [   ] Respiratory  [   ] Seizures [   ] Contact Isolation  [   ] Droplet Isolation  [   ] Other    Weight Bearing Status:     RECOMMENDATION:    Out of Bed to Chair     DVT/Decubiti Prophylaxis    REHAB PLAN:     [ x  ] Bedside P/T 3-5 times a week   [    ]   Bedside O/T  2-3 times a week             [    ] Speech Therapy               [    ]  No Rehab Therapy Indicated   Conditioning/ROM                                    ADL  Bed Mobility                                               Conditioning/ROM  Transfers                                                     Bed Mobility  Sitting /Standing Balance                         Transfers                                        Gait Training                                               Sitting/Standing Balance  Stair Training [   ]Applicable                    Home equipment Eval                                                                        Splinting  [   ] Only      GOALS:   ADL   [    ]   Independent                    Transfers  [  x  ] Independent                          Ambulation  [   x ] Independent     [  x   ] With device                            [    ]  CG                                                         [    ]  CG                                                                  [    ] CG                            [    ] Min A                                                   [    ] Min A                                                              [    ] Min  A          DISCHARGE PLAN:   [    ]  Good candidate for Intensive Rehabilitation/Hospital based                                             Will tolerate 3hrs Intensive Rehab Daily                                       [     ]  Short Term Rehab in Skilled Nursing Facility                                       [  x   ]  Home with Outpatient or  services                                         [     ]  Possible Candidate for Intensive Hospital based Rehab

## 2024-02-22 ENCOUNTER — TRANSCRIPTION ENCOUNTER (OUTPATIENT)
Age: 54
End: 2024-02-22

## 2024-02-22 VITALS
DIASTOLIC BLOOD PRESSURE: 71 MMHG | TEMPERATURE: 98 F | HEART RATE: 72 BPM | OXYGEN SATURATION: 99 % | SYSTOLIC BLOOD PRESSURE: 134 MMHG | RESPIRATION RATE: 18 BRPM

## 2024-02-22 PROCEDURE — 99238 HOSP IP/OBS DSCHRG MGMT 30/<: CPT

## 2024-02-22 RX ORDER — LEVETIRACETAM 250 MG/1
1 TABLET, FILM COATED ORAL
Qty: 12 | Refills: 0
Start: 2024-02-22 | End: 2024-02-27

## 2024-02-22 RX ORDER — MAGNESIUM SULFATE 500 MG/ML
2 VIAL (ML) INJECTION ONCE
Refills: 0 | Status: COMPLETED | OUTPATIENT
Start: 2024-02-22 | End: 2024-02-22

## 2024-02-22 RX ORDER — POTASSIUM CHLORIDE 20 MEQ
20 PACKET (EA) ORAL
Refills: 0 | Status: COMPLETED | OUTPATIENT
Start: 2024-02-22 | End: 2024-02-22

## 2024-02-22 RX ORDER — LEVETIRACETAM 250 MG/1
1 TABLET, FILM COATED ORAL
Qty: 0 | Refills: 0
Start: 2024-02-22

## 2024-02-22 RX ORDER — POTASSIUM CHLORIDE 20 MEQ
20 PACKET (EA) ORAL ONCE
Refills: 0 | Status: COMPLETED | OUTPATIENT
Start: 2024-02-22 | End: 2024-02-22

## 2024-02-22 RX ADMIN — LEVETIRACETAM 500 MILLIGRAM(S): 250 TABLET, FILM COATED ORAL at 05:04

## 2024-02-22 RX ADMIN — Medication 650 MILLIGRAM(S): at 01:54

## 2024-02-22 RX ADMIN — Medication 20 MILLIEQUIVALENT(S): at 10:37

## 2024-02-22 RX ADMIN — Medication 650 MILLIGRAM(S): at 05:04

## 2024-02-22 RX ADMIN — Medication 25 GRAM(S): at 04:30

## 2024-02-22 RX ADMIN — Medication 50 MILLIEQUIVALENT(S): at 04:31

## 2024-02-22 NOTE — DISCHARGE NOTE NURSING/CASE MANAGEMENT/SOCIAL WORK - PATIENT PORTAL LINK FT
You can access the FollowMyHealth Patient Portal offered by [facility] by registering at the following website: [email or web address]. By joining [employer]’s FollowMyHealth portal, you will also be able to view your health information using other applications (apps) compatible with our system.

## 2024-02-22 NOTE — DISCHARGE NOTE PROVIDER - NSFOLLOWUPCLINICS_GEN_ALL_ED_FT
[facility]  Trauma Surgery  [address] [place] [address]  Phone: [phone]  Fax:   Follow Up Time: 1 week

## 2024-02-22 NOTE — DISCHARGE NOTE PROVIDER - NSDCCPCAREPLAN_GEN_ALL_CORE_FT
PRINCIPAL DISCHARGE DIAGNOSIS  Diagnosis: Subarachnoid hemorrhage  Assessment and Plan of Treatment: Follow up in the trauma clinic in 1 week from discharge. please follow up with neuro in 1 week from discharge. please call to set up appointment.   diet: regular   activity: as tolerated   pain: tylenol as needed   otehr meds: continue Keppra as directed for total of 7 days. prescription sent ot pharmacy.   if you develop worsening headache, persistent vomiting, loss of consciousness, or sudden vision changes to seek medical attention.      SECONDARY DISCHARGE DIAGNOSES  Diagnosis: Subdural hematoma  Assessment and Plan of Treatment:     Diagnosis: Mediastinal hematoma, initial encounter  Assessment and Plan of Treatment:

## 2024-02-22 NOTE — DISCHARGE NOTE NURSING/CASE MANAGEMENT/SOCIAL WORK - NSDCPEFALRISK_GEN_ALL_CORE
For information on Fall & Injury Prevention, visit: [email or web address] OR  [email or web address] OR  https://www.cdc.gov/steadi/patient.html

## 2024-02-22 NOTE — DISCHARGE NOTE NURSING/CASE MANAGEMENT/SOCIAL WORK - NSDCVIVACCINE_GEN_ALL_CORE_FT
Tdap; 21-Feb-2024 06:03; Liliam Francis (RN); [employer]; [id number] (Exp. Date: 23-Nov-2025); IntraMuscular; Deltoid Left.; 0.5 milliLiter(s); VIS (VIS Published: 09-May-2013, VIS Presented: 21-Feb-2024);

## 2024-02-22 NOTE — DISCHARGE NOTE PROVIDER - HOSPITAL COURSE
53y Female w/o any significant PMHx seen as Trauma Alert s/p mechanical fall +HT, -LOC, -AC with complaint of Left arm pain, external signs of trauma include scalp laceration w staples, L forearm abrasion and R flank and chest abrasion. Pt reports she was ambulating around her house w the light off when she tripped and fell down 5steps. Pt reports she was able to ambulate afterwards without any problem, denies any dizziness or headache. Patient was initially seen at [facility], CT head and Cspine was performed w significant finding of SAH and SDH. Trauma assessment in ED: ABCs intact , GCS 15 , AAOx3,  CHING.     Injuries identified:   - scalp laceration w staples   - L forearm abrasion  - R flank and chest abrasion  - SAH  - SDH     repeat CTH resolving SAH, stable SDH in left tentorium. Neurosurgery OK for Q4 checks, continue with keppra for 7 days (1g then 500 BID)  No acute neurosurgical intervention. Patient was tolerating diet, no nausea or vomiting. Remained afebrile and hemodynamically within normal limits.     at time of discharge, patient was appropriate for home with recommendation to follow up with neuro and the trauma clinic outpatient.

## 2024-02-22 NOTE — CHART NOTE - NSCHARTNOTEFT_GEN_A_CORE
To whom it may concern,     Please excuse patient Sukumar Jaime, until 2/29/2024 as she had a recent hospitalization which she must recover from prior to returning to work.   Please avoid excessive activity until 2/29.   At 2/29, you may return back to work.     [facility]  [phone]
HPI: 53 year old female no sig past medical history comes to emergency room status post fall. patient states slipped and fell down 10 steps. hit back of head, no loss of consciousness. patient was able to stand with assistance. patient states she is having pain in the back of head and left wrist. no nausea and vomiting. no dizziness. no weakness    < from: CT Head No Cont (02.21.24 @ 13:07) >  IMPRESSION:  1.  Trace subarachnoid hemorrhage in the anterior interhemispheric   fissure is resolving  2.  There is probably a small amount of stable subdural hemorrhage along   the left tentorial margin.  3.  No mass effect, no new hemorrhage.      PLAN   - No acute neurosurgical intervention, reviewed and discussed case with Dr. Roberts   - Continue Keppra x 7 days ppx   - Follow up outpatient

## 2024-02-22 NOTE — PROGRESS NOTE ADULT - SUBJECTIVE AND OBJECTIVE BOX
GENERAL SURGERY PROGRESS NOTE     MADY TORO  [address] [place] [address] day :1d    POD:  Procedure:   Surgical Attending: Sunita Luciano  Overnight events:    no acute issues.   mg and k repleated   patient reports walking ok, and tolerating diet.   no complaints of headache, vision changes, lightheadedness or dizziness.     T(F): 97.5 (02-22-24 @ 08:00), Max: 98.7 (02-21-24 @ 20:00)  HR: 72 (02-22-24 @ 08:00) (68 - 78)  BP: 134/71 (02-22-24 @ 08:00) (93/55 - 161/77)  ABP: --  ABP(mean): --  RR: 18 (02-22-24 @ 08:00) (16 - 19)  SpO2: 99% (02-22-24 @ 08:00) (97% - 100%)    IN'S / OUT's:    02-21-24 @ 07:01  -  02-22-24 @ 07:00  --------------------------------------------------------  IN:    IV PiggyBack: 50 mL    IV PiggyBack: 100 mL  Total IN: 150 mL    OUT:    Voided (mL): 601 mL  Total OUT: 601 mL    Total NET: -451 mL      PHYSICAL EXAM:  GENERAL: NAD, well-appearing  CHEST/LUNG: Clear to auscultation bilaterally  HEART: Regular rate and rhythm  ABDOMEN: Soft, Nontender, Nondistended;   EXTREMITIES:  L forearm abrasion. No clubbing, cyanosis, or edema      LABS  Labs:  CAPILLARY BLOOD GLUCOSE                              10.3   7.76  )-----------( 254      ( 21 Feb 2024 22:28 )             32.0       Auto Neutrophil %: 72.2 % (02-21-24 @ 22:28)  Auto Immature Granulocyte %: 0.4 % (02-21-24 @ 22:28)    02-21    139  |  105  |  15  ----------------------------<  88  3.7   |  22  |  0.9      Calcium: 8.8 mg/dL (02-21-24 @ 22:28)      LFTs:             7.5  | 0.2  | 26       ------------------[105     ( 21 Feb 2024 10:20 )  4.3  | x    | 21          Lipase:27     Amylase:x         Lactate, Blood: 0.9 mmol/L (02-21-24 @ 09:15)      Coags:     11.10  ----< 0.97    ( 21 Feb 2024 09:15 )     31.5                Urinalysis Basic - ( 21 Feb 2024 23:35 )    Color: Red / Appearance: Turbid / SG: >1.030 / pH: x  Gluc: x / Ketone: Negative mg/dL  / Bili: Negative / Urobili: 1.0 mg/dL   Blood: x / Protein: 100 mg/dL / Nitrite: Negative   Leuk Esterase: Small / RBC: >1900 /HPF / WBC 45 /HPF   Sq Epi: x / Non Sq Epi: 1 /HPF / Bacteria: Occasional /HPF            RADIOLOGY & ADDITIONAL TESTS:  < from: CT Head No Cont (02.21.24 @ 13:07) >  IMPRESSION:    1.  Trace subarachnoid hemorrhage in the anterior interhemispheric   fissure is resolving    2.  There is probably a small amount of stable subdural hemorrhage along   the left tentorial margin.    3.  No mass effect, no new hemorrhage.    --- End of Report ---      < end of copied text >  < from: CT Head No Cont (02.21.24 @ 08:35) >  IMPRESSION:    CT HEAD:  Trace hyperdensity in the interhemispheric fissure likely representing   subarachnoid hemorrhage in setting of trauma.    CT CERVICAL SPINE:  No acute cervical fracture or facet subluxation.    Incidental nonspecific subcentimeter lucency in the C5 vertebral body.   Further characterization with contrast-enhanced cervical MRI can be   considered as an outpatient.    Communication: The summary of above findings were discussed with readback   confirmation with Dr. Sr byradiologist Dr. Marte on 2/21/2024 at   9:03 AM.      < end of copied text >  < from: CT Abdomen and Pelvis w/ IV Cont (02.21.24 @ 13:18) >    IMPRESSION:    1. Mild soft tissue stranding along the anterior mediastinum, possibly   reflecting small hemorrhage, however other etiologies are possible,   including residual thymic tissue. Correlation with prior imaging is   suggested if available; if prior imaging is not available, clinical   correlation and follow-up after an appropriate clinical interval may be   of use.  2. Otherwise, no definite evidence of acute traumatic injury within the   chest, abdomen or pelvis.  3. Lobulated fibroid uterus.    --- End of Report ---            < end of copied text >  < from: Xray Wrist 3 Views, Left (02.21.24 @ 07:41) >  Findings/  impression:    No evidence of acute fracture or dislocation. Soft tissue swelling of the   forearm.    --- End of Report ---      < end of copied text >      A/P:  MADY TORO is a 53y Female w/o any significant PMHx seen as Trauma Alert s/p mechanical fall +HT, -LOC, -AC with complaint of Left arm pain, external signs of trauma include scalp laceration w staples, L forearm abrasion and R flank and chest abrasion. Pt reports she was ambulating around her house w the light off when she tripped and fell down 5steps. Pt reports she was able to ambulate afterwards without any problem, denies any dizziness or headache. Patient was initially seen at [facility], CT head and Cspine was performed w significant finding of SAH. Trauma assessment in ED: ABCs intact , GCS 15 , AAOx3,  CHING.     Injuries identified:   - scalp laceration w staples   - L forearm abrasion  - R flank and chest abrasion  - SAH  - SDH     PLAN:   repeat CTH resolving SAH, stable SDH in left tentorium.   Neurosurgery OK for Q4 checks, continue with keppra for 7 days (1g then 500 BID)  No acute neurosurgical intervention  tolerating diet   wound care of abrasions- warm water and soap. keep area clean, dry   pain control- tylenol ok as needed     Physiatry- home outpt Pt or VNS. patient walking, no PT needs.     Disposition: F/U Neuro OP with Dr. Roberts in 1-2 weeks, F/U Trauma clinic in 1 week for staple removal     Above plan to be discussed with Attending Surgeon Dr. Ankita LEWIS (Trauma, Acute care, Pediatrics) Spectra [phone]

## 2024-02-22 NOTE — DISCHARGE NOTE PROVIDER - CARE PROVIDER_API CALL
Pavithra Roberts  Neurosurgery  [address] [place] [address], [address]  [place] [address]  Phone: [phone]  Fax: [phone]  Follow Up Time: 1 week

## 2024-02-28 PROBLEM — Z78.9 OTHER SPECIFIED HEALTH STATUS: Chronic | Status: ACTIVE | Noted: 2024-02-21

## 2024-02-28 PROBLEM — Z00.00 ENCOUNTER FOR PREVENTIVE HEALTH EXAMINATION: Status: ACTIVE | Noted: 2024-02-28
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
## 2024-02-29 DIAGNOSIS — M25.532 PAIN IN LEFT WRIST: ICD-10-CM
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
## 2024-02-29 DIAGNOSIS — S50.812A ABRASION OF LEFT FOREARM, INITIAL ENCOUNTER: ICD-10-CM

## 2024-02-29 DIAGNOSIS — S30.811A ABRASION OF ABDOMINAL WALL, INITIAL ENCOUNTER: ICD-10-CM

## 2024-02-29 DIAGNOSIS — S06.6X0A TRAUMATIC SUBARACHNOID HEMORRHAGE WITHOUT LOSS OF CONSCIOUSNESS, INITIAL ENCOUNTER: ICD-10-CM

## 2024-02-29 DIAGNOSIS — S27.892A CONTUSION OF OTHER SPECIFIED INTRATHORACIC ORGANS, INITIAL ENCOUNTER: ICD-10-CM

## 2024-02-29 DIAGNOSIS — S06.5X0A TRAUMATIC SUBDURAL HEMORRHAGE WITHOUT LOSS OF CONSCIOUSNESS, INITIAL ENCOUNTER: ICD-10-CM

## 2024-03-01 ENCOUNTER — EMERGENCY (EMERGENCY)
Facility: HOSPITAL | Age: 54
LOS: 0 days | Discharge: HOME | End: 2024-03-01
Attending: EMERGENCY MEDICINE
Payer: MEDICAID

## 2024-03-01 VITALS
HEART RATE: 95 BPM | TEMPERATURE: 98 F | OXYGEN SATURATION: 100 % | RESPIRATION RATE: 18 BRPM | DIASTOLIC BLOOD PRESSURE: 95 MMHG | WEIGHT: 139.99 LBS | SYSTOLIC BLOOD PRESSURE: 197 MMHG

## 2024-03-01 DIAGNOSIS — S01.01XD LACERATION WITHOUT FOREIGN BODY OF SCALP, SUBSEQUENT ENCOUNTER: ICD-10-CM

## 2024-03-01 DIAGNOSIS — Z48.02 ENCOUNTER FOR REMOVAL OF SUTURES: ICD-10-CM

## 2024-03-01 PROCEDURE — L9995: CPT

## 2024-03-01 PROCEDURE — 99212 OFFICE O/P EST SF 10 MIN: CPT

## 2024-03-01 NOTE — ED PROVIDER NOTE - PHYSICAL EXAMINATION
VITAL SIGNS: I have reviewed nursing notes and confirm.  CONSTITUTIONAL: Well-developed; well-nourished  SKIN:  healed lac to scalp, skin exam is warm and dry, no acute rash.    HEAD: Normocephalic; atraumatic.  EYES: conjunctiva and sclera clear.  ENT: No nasal discharge; airway clear.  EXT: Normal ROM.  No clubbing, cyanosis or edema.   NEURO: Alert, oriented, grossly unremarkable

## 2024-03-01 NOTE — ED PROVIDER NOTE - PATIENT PORTAL LINK FT
You can access the FollowMyHealth Patient Portal offered by [facility] by registering at the following website: [email or web address]. By joining [employer]’s FollowMyHealth portal, you will also be able to view your health information using other applications (apps) compatible with our system.

## 2024-03-01 NOTE — ED PROVIDER NOTE - OBJECTIVE STATEMENT
Patient is a 53-year-old female here for staple removal after laceration repair 1 week ago.  Patient denies weakness, drainage, redness, fever, chills

## 2024-04-15 NOTE — ED ADULT TRIAGE NOTE - WEIGHT IN LBS
160 Follow up with your PMD within 48-72 hrs. Show copies of your reports given to you.   Worsening, continued or new concerning symptoms return to the emergency department.    You have been given information necessary to follow up with the  [facility] ([facility]) Crisis center & other outpatient  psychiatric clinics within your community    • [facility] walk in Crisis centre  [address] [place] [address]  [phone] [email or web address]  Hours of operation:  Day	                                        Hours  Sunday                                  Closed  Monday                                9am - 3pm  Tuesday                                9am - 3pm  Wednesday                          9am - 3pm  Thursday                               9am - 3pm  Friday                                    9am - 3pm  Saturday                                Closed

## 2024-05-02 ENCOUNTER — APPOINTMENT (OUTPATIENT)
Dept: NEUROSURGERY | Facility: CLINIC | Age: 54
End: 2024-05-02